# Patient Record
Sex: MALE | Race: BLACK OR AFRICAN AMERICAN | NOT HISPANIC OR LATINO | URBAN - METROPOLITAN AREA
[De-identification: names, ages, dates, MRNs, and addresses within clinical notes are randomized per-mention and may not be internally consistent; named-entity substitution may affect disease eponyms.]

---

## 2018-03-25 ENCOUNTER — EMERGENCY (EMERGENCY)
Facility: HOSPITAL | Age: 33
LOS: 0 days | Discharge: HOME | End: 2018-03-26
Attending: EMERGENCY MEDICINE | Admitting: EMERGENCY MEDICINE
Payer: COMMERCIAL

## 2018-03-25 VITALS — WEIGHT: 210.1 LBS | HEIGHT: 70 IN
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
## 2018-03-25 DIAGNOSIS — S69.82XA OTHER SPECIFIED INJURIES OF LEFT WRIST, HAND AND FINGER(S), INITIAL ENCOUNTER: ICD-10-CM
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
## 2018-03-25 DIAGNOSIS — S63.682A OTHER SPRAIN OF LEFT THUMB, INITIAL ENCOUNTER: ICD-10-CM

## 2018-03-25 DIAGNOSIS — S13.4XXA SPRAIN OF LIGAMENTS OF CERVICAL SPINE, INITIAL ENCOUNTER: ICD-10-CM

## 2018-03-25 PROCEDURE — 99284 EMERGENCY DEPT VISIT MOD MDM: CPT | Mod: 25
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
## 2018-03-26 VITALS
TEMPERATURE: 99 F | SYSTOLIC BLOOD PRESSURE: 129 MMHG | DIASTOLIC BLOOD PRESSURE: 93 MMHG | HEART RATE: 86 BPM | OXYGEN SATURATION: 97 %

## 2018-03-26 PROCEDURE — 73140 X-RAY EXAM OF FINGER(S): CPT | Mod: 26,LT

## 2018-03-26 PROCEDURE — 72040 X-RAY EXAM NECK SPINE 2-3 VW: CPT | Mod: 26

## 2018-03-26 RX ORDER — IBUPROFEN 200 MG
600 TABLET ORAL ONCE
Qty: 0 | Refills: 0 | Status: COMPLETED | OUTPATIENT
Start: 2018-03-26 | End: 2018-03-26

## 2018-03-26 RX ADMIN — Medication 600 MILLIGRAM(S): at 01:14

## 2018-03-26 NOTE — ED ADULT NURSE NOTE - CHPI ED SYMPTOMS NEG
no numbness/no tingling/no dizziness/no vomiting/no chills/no weakness/no fever/no nausea/no decreased eating/drinking

## 2018-03-26 NOTE — ED PROCEDURE NOTE - CPROC ED TIME OUT STATEMENT1
“Patient's name, , procedure and correct site were confirmed during the [place] Timeout.”

## 2018-03-26 NOTE — ED PROVIDER NOTE - CARE PLAN
Principal Discharge DX:	Cervical strain, acute, initial encounter  Secondary Diagnosis:	Sprain of left thumb, initial encounter
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
## 2018-03-26 NOTE — ED PROVIDER NOTE - MUSCULOSKELETAL, MLM
Spine appears normal without any midline tenderness.  +left trapezius muscle tenderness, range of motion is not limited for neck or bilateral upper extremities.  +focal bony tenderness at base of left thumb with ecchymosis and swelling associated with left thenar eminence.  No snuff box tenderness.

## 2018-03-26 NOTE — ED PROVIDER NOTE - MEDICAL DECISION MAKING DETAILS
Xrays and motrin ordered.  Will refer to orthopedics for fracture.  Will give splint for thumb sprain if no fracture.